# Patient Record
Sex: FEMALE | Race: WHITE | NOT HISPANIC OR LATINO | Employment: FULL TIME | URBAN - METROPOLITAN AREA
[De-identification: names, ages, dates, MRNs, and addresses within clinical notes are randomized per-mention and may not be internally consistent; named-entity substitution may affect disease eponyms.]

---

## 2017-02-16 ENCOUNTER — HOSPITAL ENCOUNTER (OUTPATIENT)
Dept: LAB | Facility: MEDICAL CENTER | Age: 42
End: 2017-02-16
Attending: INTERNAL MEDICINE
Payer: COMMERCIAL

## 2017-02-16 LAB
ALBUMIN SERPL BCP-MCNC: 4.1 G/DL (ref 3.2–4.9)
ALBUMIN/GLOB SERPL: 1.2 G/DL
ALP SERPL-CCNC: 80 U/L (ref 30–99)
ALT SERPL-CCNC: 22 U/L (ref 2–50)
ANION GAP SERPL CALC-SCNC: 9 MMOL/L (ref 0–11.9)
AST SERPL-CCNC: 16 U/L (ref 12–45)
BILIRUB SERPL-MCNC: 1.2 MG/DL (ref 0.1–1.5)
BUN SERPL-MCNC: 9 MG/DL (ref 8–22)
CALCIUM SERPL-MCNC: 9.8 MG/DL (ref 8.5–10.5)
CHLORIDE SERPL-SCNC: 101 MMOL/L (ref 96–112)
CO2 SERPL-SCNC: 23 MMOL/L (ref 20–33)
CREAT SERPL-MCNC: 0.68 MG/DL (ref 0.5–1.4)
EST. AVERAGE GLUCOSE BLD GHB EST-MCNC: 309 MG/DL
GLOBULIN SER CALC-MCNC: 3.3 G/DL (ref 1.9–3.5)
GLUCOSE SERPL-MCNC: 331 MG/DL (ref 65–99)
HBA1C MFR BLD: 12.4 % (ref 0–5.6)
POTASSIUM SERPL-SCNC: 4.6 MMOL/L (ref 3.6–5.5)
PROT SERPL-MCNC: 7.4 G/DL (ref 6–8.2)
SODIUM SERPL-SCNC: 133 MMOL/L (ref 135–145)

## 2017-02-16 PROCEDURE — 86038 ANTINUCLEAR ANTIBODIES: CPT

## 2017-02-16 PROCEDURE — 80053 COMPREHEN METABOLIC PANEL: CPT

## 2017-02-16 PROCEDURE — 83036 HEMOGLOBIN GLYCOSYLATED A1C: CPT

## 2017-02-16 PROCEDURE — 36415 COLL VENOUS BLD VENIPUNCTURE: CPT

## 2017-02-19 LAB
NUCLEAR IGG SER QL IA: DETECTED
NUCLEAR IGG TITR SER IF: ABNORMAL {TITER}

## 2017-02-28 ENCOUNTER — HOSPITAL ENCOUNTER (OUTPATIENT)
Dept: LAB | Facility: MEDICAL CENTER | Age: 42
End: 2017-02-28
Attending: INTERNAL MEDICINE
Payer: COMMERCIAL

## 2017-02-28 PROCEDURE — 81001 URINALYSIS AUTO W/SCOPE: CPT

## 2017-02-28 PROCEDURE — 86225 DNA ANTIBODY NATIVE: CPT

## 2017-02-28 PROCEDURE — 86038 ANTINUCLEAR ANTIBODIES: CPT

## 2017-02-28 PROCEDURE — 86235 NUCLEAR ANTIGEN ANTIBODY: CPT

## 2017-02-28 PROCEDURE — 85025 COMPLETE CBC W/AUTO DIFF WBC: CPT

## 2017-02-28 PROCEDURE — 83516 IMMUNOASSAY NONANTIBODY: CPT | Mod: 91

## 2017-02-28 PROCEDURE — 36415 COLL VENOUS BLD VENIPUNCTURE: CPT

## 2017-03-04 LAB
APPEARANCE UR: CLEAR
BASOPHILS # BLD AUTO: 0.11 K/UL (ref 0–0.12)
BASOPHILS NFR BLD AUTO: 1 % (ref 0–1.8)
BILIRUB UR QL STRIP.AUTO: NEGATIVE
CENTROMERE IGG TITR SER IF: 4 AU/ML (ref 0–40)
CHROMATIN IGG SERPL-ACNC: 9 UNITS (ref 0–19)
COLOR UR AUTO: YELLOW
DSDNA AB TITR SER CLIF: DETECTED {TITER}
DSDNA IGG TITR SER CLIF: ABNORMAL {TITER}
ENA JO1 AB TITR SER: 2 AU/ML (ref 0–40)
ENA SCL70 IGG SER QL: 1 AU/ML (ref 0–40)
ENA SM IGG SER-ACNC: 0 AU/ML (ref 0–40)
ENA SS-B IGG SER IA-ACNC: 0 AU/ML (ref 0–40)
EOSINOPHIL # BLD: 0.1 K/UL (ref 0–0.51)
EOSINOPHIL NFR BLD AUTO: 0.9 % (ref 0–6.9)
EPITHELIAL CELLS 1715: ABNORMAL /HPF
ERYTHROCYTE [DISTWIDTH] IN BLOOD BY AUTOMATED COUNT: 37.2 FL (ref 35.9–50)
GLUCOSE UR STRIP.AUTO-MCNC: >1000 MG/DL
HCT VFR BLD AUTO: 47.3 % (ref 37–47)
HGB BLD-MCNC: 16.3 G/DL (ref 12–16)
IMM GRANULOCYTES # BLD AUTO: 0.05 K/UL (ref 0–0.11)
IMM GRANULOCYTES NFR BLD AUTO: 0.5 % (ref 0–0.9)
KETONES UR STRIP.AUTO-MCNC: ABNORMAL MG/DL
LEUKOCYTE ESTERASE UR QL STRIP.AUTO: NEGATIVE
LYMPHOCYTES # BLD: 3.59 K/UL (ref 1–4.8)
LYMPHOCYTES NFR BLD AUTO: 33.6 % (ref 22–41)
MCH RBC QN AUTO: 29.5 PG (ref 27–33)
MCHC RBC AUTO-ENTMCNC: 34.5 G/DL (ref 33.6–35)
MCV RBC AUTO: 85.5 FL (ref 81.4–97.8)
MICRO URNS: ABNORMAL
MONOCYTES # BLD: 0.54 K/UL (ref 0–0.85)
MONOCYTES NFR BLD AUTO: 5 % (ref 0–13.4)
NEUTROPHILS # BLD: 6.31 K/UL (ref 2–7.15)
NEUTROPHILS NFR BLD AUTO: 59 % (ref 44–72)
NITRITE UR QL STRIP.AUTO: NEGATIVE
NRBC # BLD AUTO: 0 K/UL
NRBC BLD-RTO: 0 /100 WBC
NUCLEAR IGG SER QL IA: DETECTED
NUCLEAR IGG TITR SER IF: ABNORMAL {TITER}
PH UR: 5.5 [PH]
PLATELET # BLD AUTO: 311 K/UL (ref 164–446)
PMV BLD AUTO: 10.8 FL (ref 9–12.9)
PROT UR QL STRIP: NEGATIVE MG/DL
RBC # BLD AUTO: 5.53 M/UL (ref 4.2–5.4)
RBC #/AREA URNS HPF: ABNORMAL /HPF
RBC UR QL AUTO: ABNORMAL
SP GR UR STRIP.AUTO: >1.035
SSA52 R0ENA AB IGG Q0420: 4 AU/ML (ref 0–40)
SSA60 R0ENA AB IGG Q0419: 2 AU/ML (ref 0–40)
TEST NAME 95000: NORMAL
U1 SNRNP IGG SER QL: 0 AU/ML (ref 0–40)
WBC # BLD AUTO: 10.7 K/UL (ref 4.8–10.8)

## 2019-05-14 ENCOUNTER — OFFICE VISIT (OUTPATIENT)
Dept: ENDOCRINOLOGY | Facility: MEDICAL CENTER | Age: 44
End: 2019-05-14
Payer: COMMERCIAL

## 2019-05-14 ENCOUNTER — HOSPITAL ENCOUNTER (OUTPATIENT)
Dept: LAB | Facility: MEDICAL CENTER | Age: 44
End: 2019-05-14
Attending: PHYSICIAN ASSISTANT
Payer: COMMERCIAL

## 2019-05-14 VITALS
WEIGHT: 251 LBS | HEIGHT: 70 IN | BODY MASS INDEX: 35.93 KG/M2 | SYSTOLIC BLOOD PRESSURE: 120 MMHG | HEART RATE: 90 BPM | DIASTOLIC BLOOD PRESSURE: 76 MMHG | OXYGEN SATURATION: 97 %

## 2019-05-14 DIAGNOSIS — Z79.4 ENCOUNTER FOR LONG-TERM (CURRENT) USE OF INSULIN (HCC): ICD-10-CM

## 2019-05-14 DIAGNOSIS — E11.65 UNCONTROLLED TYPE 2 DIABETES MELLITUS WITH HYPERGLYCEMIA (HCC): ICD-10-CM

## 2019-05-14 LAB
ALBUMIN SERPL BCP-MCNC: 4.1 G/DL (ref 3.2–4.9)
ALBUMIN/GLOB SERPL: 1.2 G/DL
ALP SERPL-CCNC: 82 U/L (ref 30–99)
ALT SERPL-CCNC: 41 U/L (ref 2–50)
ANION GAP SERPL CALC-SCNC: 10 MMOL/L (ref 0–11.9)
AST SERPL-CCNC: 56 U/L (ref 12–45)
BILIRUB SERPL-MCNC: 1.1 MG/DL (ref 0.1–1.5)
BUN SERPL-MCNC: 8 MG/DL (ref 8–22)
CALCIUM SERPL-MCNC: 9.6 MG/DL (ref 8.5–10.5)
CHLORIDE SERPL-SCNC: 102 MMOL/L (ref 96–112)
CO2 SERPL-SCNC: 21 MMOL/L (ref 20–33)
CREAT SERPL-MCNC: 0.68 MG/DL (ref 0.5–1.4)
CREAT UR-MCNC: 93.4 MG/DL
FASTING STATUS PATIENT QL REPORTED: NORMAL
GLOBULIN SER CALC-MCNC: 3.3 G/DL (ref 1.9–3.5)
GLUCOSE SERPL-MCNC: 357 MG/DL (ref 65–99)
MICROALBUMIN UR-MCNC: 1.7 MG/DL
MICROALBUMIN/CREAT UR: 18 MG/G (ref 0–30)
POTASSIUM SERPL-SCNC: 4.1 MMOL/L (ref 3.6–5.5)
PROT SERPL-MCNC: 7.4 G/DL (ref 6–8.2)
SODIUM SERPL-SCNC: 133 MMOL/L (ref 135–145)

## 2019-05-14 PROCEDURE — 86337 INSULIN ANTIBODIES: CPT

## 2019-05-14 PROCEDURE — 36415 COLL VENOUS BLD VENIPUNCTURE: CPT

## 2019-05-14 PROCEDURE — 82570 ASSAY OF URINE CREATININE: CPT

## 2019-05-14 PROCEDURE — 99204 OFFICE O/P NEW MOD 45 MIN: CPT | Performed by: PHYSICIAN ASSISTANT

## 2019-05-14 PROCEDURE — 86341 ISLET CELL ANTIBODY: CPT | Mod: 91

## 2019-05-14 PROCEDURE — 84681 ASSAY OF C-PEPTIDE: CPT

## 2019-05-14 PROCEDURE — 80053 COMPREHEN METABOLIC PANEL: CPT

## 2019-05-14 PROCEDURE — 82043 UR ALBUMIN QUANTITATIVE: CPT

## 2019-05-14 RX ORDER — LEUCOVORIN CALCIUM 5 MG/1
TABLET ORAL
COMMUNITY
Start: 2019-04-26

## 2019-05-14 RX ORDER — FOLIC ACID 1 MG/1
TABLET ORAL
COMMUNITY
Start: 2019-04-25

## 2019-05-14 RX ORDER — SULFAMETHOXAZOLE AND TRIMETHOPRIM 800; 160 MG/1; MG/1
TABLET ORAL
COMMUNITY
Start: 2019-05-10

## 2019-05-14 RX ORDER — ALPRAZOLAM 0.25 MG/1
TABLET ORAL
COMMUNITY
Start: 2019-03-20

## 2019-05-14 RX ORDER — ROSUVASTATIN CALCIUM 10 MG/1
TABLET, COATED ORAL
COMMUNITY
Start: 2019-03-20 | End: 2021-01-25

## 2019-05-14 RX ORDER — INSULIN GLARGINE 100 [IU]/ML
INJECTION, SOLUTION SUBCUTANEOUS
COMMUNITY
Start: 2019-03-29 | End: 2019-07-26

## 2019-05-14 RX ORDER — BLOOD SUGAR DIAGNOSTIC
STRIP MISCELLANEOUS
Refills: 12 | COMMUNITY
Start: 2019-02-15

## 2019-05-14 NOTE — PROGRESS NOTES
New Patient Consult Note  Referred by: Bry Eid M.D.    Reason for consult: Diabetes Management Type 2    HPI:  Meredith Corral is a 43 y.o. old patient who is seeing us today for diabetes care.  This is a pleasant patient with diabetes and I appreciate the opportunity to participate in the care of this patient.  This is a new patient with me today.    Labs of  4/17/2019 HbA1c is 10.7,     BG Diary:5/14/2019  In the AM: no log    Has been Diabetic since T2   Has a Glucagon pen at home: no    1. Uncontrolled type 2 diabetes mellitus with hyperglycemia (HCC)  This is a new patient with me on 5/14/2019  They are on:  1.  Lantus  20 units   2.  Humalog 24 units with three times a day    States Metformin causes her to be sick  States GLP-1 cause her to be ill    2. Encounter for long-term (current) use of insulin (HCC)  Is on a high risk medication Insulin and we will continue to follow       ROS:   Constitutional: No change in weight , No fatigue, No night sweats.  HEENT: No Headache.  Eyes:  No blurred vision, No visual changes.  Cardiac: No chest pain, No palpitations.  Resp: No shortness of breath, No cough,   Gastro: No nausea or vomiting, No diarrhea.  Neuro: Denies numbness or tinging in bilateral feet or hands, and no loss of sensation.  Endo: No heat or cold intolerance.  : No polyuria, No polydipsia, No chronic UTI's.  Lower extremities: No lower leg edema bilateral.  All other systems were reviewed and were negative.    Past Medical History:  Patient Active Problem List    Diagnosis Date Noted   • Uncontrolled type 2 diabetes mellitus with hyperglycemia (HCC) 05/14/2019   • Encounter for long-term (current) use of insulin (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     Social History Main Topics   • Smoking status: Former Smoker     Packs/day: 0.50     Types: Cigarettes     Quit date: 5/14/2013   • Smokeless tobacco: Former User   • Alcohol use Yes      Comment: rarely   • Drug use: No   • Sexual activity: Not on file     Other Topics Concern   • Not on file     Social History Narrative   • No narrative on file       Family History:  History reviewed. No pertinent family history.    Medications:    Current Outpatient Prescriptions:   •  LANTUS SOLOSTAR 100 UNIT/ML Solution Pen-injector injection, , Disp: , Rfl:   •  methotrexate 2.5 MG Tab, Take 2.5 mg by mouth every 7 days., Disp: , Rfl:   •  leucovorin 5 MG Tab, , Disp: , Rfl:   •  rosuvastatin (CRESTOR) 10 MG Tab, , Disp: , Rfl:   •  folic acid (FOLVITE) 1 MG Tab, , Disp: , Rfl:   •  ALPRAZolam (XANAX) 0.25 MG Tab, , Disp: , Rfl:   •  sulfamethoxazole-trimethoprim (BACTRIM DS) 800-160 MG tablet, , Disp: , Rfl:   •  lorazepam (ATIVAN) 0.5 MG Tab, Take 0.5 mg by mouth every four hours as needed for Anxiety., Disp: , Rfl:   •  hydrocodone-acetaminophen (NORCO) 7.5-325 MG per tablet, Take 1-2 Tabs by mouth every 6 hours as needed., Disp: , Rfl:   •  Cholecalciferol (VITAMIN D) 2000 UNITS CAPS, Take 4,000 Units by mouth every day., Disp: , Rfl:   •  Multiple Vitamin (MULTI-VITAMIN DAILY PO), Take  by mouth every day., Disp: , Rfl:   •  ONETOUCH VERIO strip, , Disp: , Rfl: 12  •  GABAPENTIN PO, Take  by mouth 3 times a day., Disp: , Rfl:       Physical Examination:  Vital signs: /76 (BP Location: Left arm, Patient Position: Sitting)   Pulse 90   Ht 1.778 m (5' 10\")   Wt 113.9 kg (251 lb)   SpO2 97%   BMI 36.01 " kg/m²   General: No distress, cooperative, well dressed and well nourished.   Eyes: No scleral icterus or discharge, No hyposphagma  ENMT: Normal on external inspection of nose, lips, No nasal drainage   Neck: No abnormal masses on inspection  Resp: Normal effort, Bilateral clear to auscultation, No wheezing, No rales  CVS: Regular rate and rhythm, S1 S2 normal, No murmur. No gallop  Extremities: No edema bilateral extremities  Neuro: Alert and oriented  Skin: No rash, No Ulcers  Psych: Normal mood and affect      Assessment and Plan:    1. Uncontrolled type 2 diabetes mellitus with hyperglycemia (HCC)  Start:  1.   Jardiance 25mg one a day  2.   Tresiba 40 units before bed  3.   Humalog  Carb ratio of 1:10  Correction Factor 1:30 above 100      I will start a low dose of Victoza    States Metformin causes her to be sick  States GLP-1 cause her to be ill    2. Encounter for long-term (current) use of insulin (HCC)  Is on a high risk medication Insulin and we will continue to follow     Return in about 1 month (around 6/14/2019).       This patient during there office visit was started on new medication Jardiance and tresiba.  Side effects of new medications were discussed with the patient today in the office. The patient was supplied paperwork on this new medication.    Thank you kindly for allowing me to participate in the diabetes care plan for this patient.    Judd Salas PA-C, BC-ADM  Board Certified - Advanced Diabetes Management  05/14/19    CC:   Bry Eid M.D.

## 2019-05-16 LAB
C PEPTIDE SERPL-MCNC: 2.2 NG/ML (ref 0.8–3.5)
GAD65 AB SER IA-ACNC: <5 IU/ML (ref 0–5)
INSULIN HUMAN AB SER-ACNC: <0.4 U/ML (ref 0–0.4)
PANC ISLET CELL AB TITR SER: NORMAL {TITER}

## 2019-06-14 ENCOUNTER — OFFICE VISIT (OUTPATIENT)
Dept: ENDOCRINOLOGY | Facility: MEDICAL CENTER | Age: 44
End: 2019-06-14
Payer: COMMERCIAL

## 2019-06-14 VITALS
OXYGEN SATURATION: 98 % | BODY MASS INDEX: 36.36 KG/M2 | HEART RATE: 89 BPM | HEIGHT: 70 IN | WEIGHT: 254 LBS | DIASTOLIC BLOOD PRESSURE: 72 MMHG | SYSTOLIC BLOOD PRESSURE: 110 MMHG

## 2019-06-14 DIAGNOSIS — E11.65 UNCONTROLLED TYPE 2 DIABETES MELLITUS WITH HYPERGLYCEMIA (HCC): ICD-10-CM

## 2019-06-14 DIAGNOSIS — Z79.4 ENCOUNTER FOR LONG-TERM (CURRENT) USE OF INSULIN (HCC): ICD-10-CM

## 2019-06-14 PROCEDURE — 99214 OFFICE O/P EST MOD 30 MIN: CPT | Performed by: PHYSICIAN ASSISTANT

## 2019-06-14 NOTE — PROGRESS NOTES
Return to office Patient Consult Note  Referred by: Bry Eid M.D.    Reason for consult: Diabetes Management Type 2    HPI:  Meredith Corral is a 43 y.o. old patient who is seeing us today for diabetes care.  This is a pleasant patient with diabetes and I appreciate the opportunity to participate in the care of this patient.      Labs of  4/17/2019 HbA1c is 10.7,     BG Diary:6/14/2019  In the AM:  No log        1. Uncontrolled type 2 diabetes mellitus with hyperglycemia (HCC)  This is a new patient with me on 5/14/2019  They are on:  1.  Lantus  20 units   2.  Humalog 24 units with three times a day     States Metformin causes her to be sick  States GLP-1 cause her to be ill    Start:  1.   Jardiance 25mg one a day  2.   Tresiba 40 units before bed  3.   Humalog  Carb ratio of 1:10  Correction Factor 1:30 above 100          States Metformin causes her to be sick  States GLP-1 cause her to be ill    She is doing well no issues    2. Encounter for long-term (current) use of insulin ([facility])  Is on a high risk medication Insulin and we will continue to follow           ROS:   Constitutional: No night sweats.  Eyes:  No visual changes.  Cardiac: No chest pain, No palpitations or racing heart rate.  Resp: No shortness of breath, No cough,   Gi: No Diarrhea    All other systems were reviewed and were/are negative.      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     Social History Main Topics   • Smoking status: Former Smoker     Packs/day: 0.50     Types: Cigarettes     Quit date: 5/14/2013   • Smokeless tobacco: Former User   • Alcohol use Yes      Comment: rarely   • Drug use: No   • Sexual activity: Not on file     Other Topics Concern   • Not on file     Social History Narrative   • No narrative on file       Family History:  History reviewed. No pertinent family history.    Medications:    Current Outpatient Prescriptions:   •  LANTUS SOLOSTAR 100 UNIT/ML Solution Pen-injector injection, , Disp: , Rfl:   •  methotrexate 2.5 MG Tab, Take 2.5 mg by mouth every 7 days., Disp: , Rfl:   •  leucovorin 5 MG Tab, , Disp: , Rfl:   •  rosuvastatin (CRESTOR) 10 MG Tab, , Disp: , Rfl:   •  ONETOUCH VERIO strip, , Disp: , Rfl: 12  •  folic acid (FOLVITE) 1 MG Tab, , Disp: , Rfl:   •  ALPRAZolam (XANAX) 0.25 MG Tab, , Disp: , Rfl:   •  sulfamethoxazole-trimethoprim (BACTRIM DS) 800-160 MG tablet, , Disp: , Rfl:   •  lorazepam (ATIVAN) 0.5 MG Tab, Take 0.5 mg by mouth every four hours as needed for Anxiety., Disp: , Rfl:   •  hydrocodone-acetaminophen (NORCO) 7.5-325 MG per tablet, Take 1-2 Tabs by mouth every 6 hours as needed., Disp: , Rfl:   •  GABAPENTIN PO, Take  by mouth 3 times a day., Disp: , Rfl:   •  Cholecalciferol (VITAMIN D) 2000 UNITS CAPS, Take 4,000 Units by mouth every day., Disp: , Rfl:   •  Multiple Vitamin (MULTI-VITAMIN DAILY PO), Take  by mouth every day., Disp: , Rfl:         Physical Examination:   Vital signs: /72 (BP Location: Left arm, Patient Position: Sitting)   Pulse 89   Ht 1.778 m (5' 10\")   Wt 115.2 kg (254 lb)   SpO2 98%   BMI 36.45 kg/m²   General: No distress, cooperative, well dressed and well nourished.   Eyes: No scleral " icterus or discharge, No hyposphagma  ENMT: Normal on external inspection of nose, lips, No nasal drainage   Neck: No abnormal masses on inspection  Resp: Normal effort, Bilateral clear to auscultation, No wheezing, No rales  CVS: Regular rate and rhythm, S1 S2 normal, No murmur. No gallop  Extremities: No edema bilateral extremities  Neuro: Alert and oriented  Skin: No rash, No Ulcers  Psych: Normal mood and affect      Assessment and Plan:    1. Uncontrolled type 2 diabetes mellitus with hyperglycemia (HCC)    Start:  1.   Jardiance 25mg one a day  2.   Tresiba 40 units before bed  3.   Humalog  Carb ratio of 1:10  Correction Factor 1:30 above 100       I will start a low dose of Victoza 5 clicks for two weeks then INCREASE to 0.6    2. Encounter for long-term (current) use of insulin (HCC)  Is on a high risk medication Insulin and we will continue to follow         No Follow-up on file.      This patient during there office visit today was started on a new medication Victoza.  Side effects of the new medication were discussed with the patient today in the office.       Thank you kindly for allowing me to participate in the diabetes care plan for this patient.    Judd Salas PA-C, BC-ADM  Board Certified - Advanced Diabetes Management  06/14/19    CC:   Bry Eid M.D.

## 2019-07-26 ENCOUNTER — OFFICE VISIT (OUTPATIENT)
Dept: ENDOCRINOLOGY | Facility: MEDICAL CENTER | Age: 44
End: 2019-07-26
Payer: COMMERCIAL

## 2019-07-26 ENCOUNTER — HOSPITAL ENCOUNTER (OUTPATIENT)
Dept: LAB | Facility: MEDICAL CENTER | Age: 44
End: 2019-07-26
Attending: PHYSICIAN ASSISTANT
Payer: COMMERCIAL

## 2019-07-26 VITALS
DIASTOLIC BLOOD PRESSURE: 76 MMHG | WEIGHT: 254 LBS | HEART RATE: 87 BPM | HEIGHT: 70 IN | BODY MASS INDEX: 36.36 KG/M2 | OXYGEN SATURATION: 99 % | SYSTOLIC BLOOD PRESSURE: 116 MMHG

## 2019-07-26 DIAGNOSIS — Z79.4 ENCOUNTER FOR LONG-TERM (CURRENT) USE OF INSULIN (HCC): ICD-10-CM

## 2019-07-26 DIAGNOSIS — E11.65 UNCONTROLLED TYPE 2 DIABETES MELLITUS WITH HYPERGLYCEMIA (HCC): ICD-10-CM

## 2019-07-26 LAB
HBA1C MFR BLD: 9.1 % (ref 0–5.6)
INT CON NEG: NEGATIVE
INT CON POS: POSITIVE
T3FREE SERPL-MCNC: 3.51 PG/ML (ref 2.4–4.2)
T4 FREE SERPL-MCNC: 0.81 NG/DL (ref 0.53–1.43)
THYROPEROXIDASE AB SERPL-ACNC: 1 IU/ML (ref 0–9)
TSH SERPL DL<=0.005 MIU/L-ACNC: 2.07 UIU/ML (ref 0.38–5.33)

## 2019-07-26 PROCEDURE — 99214 OFFICE O/P EST MOD 30 MIN: CPT | Performed by: PHYSICIAN ASSISTANT

## 2019-07-26 PROCEDURE — 84481 FREE ASSAY (FT-3): CPT

## 2019-07-26 PROCEDURE — 83036 HEMOGLOBIN GLYCOSYLATED A1C: CPT | Performed by: PHYSICIAN ASSISTANT

## 2019-07-26 PROCEDURE — 84439 ASSAY OF FREE THYROXINE: CPT

## 2019-07-26 PROCEDURE — 36415 COLL VENOUS BLD VENIPUNCTURE: CPT

## 2019-07-26 PROCEDURE — 86376 MICROSOMAL ANTIBODY EACH: CPT

## 2019-07-26 PROCEDURE — 84443 ASSAY THYROID STIM HORMONE: CPT

## 2019-07-26 RX ORDER — LIRAGLUTIDE 6 MG/ML
1.8 INJECTION SUBCUTANEOUS DAILY
Qty: 3 PEN | Refills: 6 | Status: SHIPPED | OUTPATIENT
Start: 2019-07-26 | End: 2019-12-26

## 2019-07-26 RX ORDER — EMPAGLIFLOZIN 25 MG/1
1 TABLET, FILM COATED ORAL DAILY
Qty: 30 TAB | Refills: 3 | Status: SHIPPED | OUTPATIENT
Start: 2019-07-26 | End: 2021-01-25

## 2019-07-26 NOTE — PROGRESS NOTES
Return to office Patient Consult Note  Referred by: Bry Eid M.D.    Reason for consult: Diabetes Management Type 2    HPI:  Meredith Corral is a 43 y.o. old patient who is seeing us today for diabetes care.  This is a pleasant patient with diabetes and I appreciate the opportunity to participate in the care of this patient.    Labs of 7/26/2019 HbA1c is 9.1  Labs of 5/14/19 c-peptide 2.2, microalbumin negative, GFR>60     Labs of  4/17/2019 HbA1c is 10.7,     BG Diary:7/26/2019  In the AM:  No log    1. Uncontrolled type 2 diabetes mellitus with hyperglycemia (HCC)  This is a new patient with me on 5/14/2019    Now on:  1.   Jardiance 25mg one a day  2.   Tresiba 40 units before bed  3.   Humalog  Carb ratio of 1:10  Correction Factor 1:30 above 100       4.   Victoza 1.2 daily     States Metformin causes her to be sick  States GLP-1 cause her to be ill    2. Encounter for long-term (current) use of insulin (HCC)  Is on a high risk medication Insulin and we will continue to follow          ROS:   Constitutional: No night sweats.  Eyes:  No visual changes.  Cardiac: No chest pain, No palpitations or racing heart rate.  Resp: No shortness of breath, No cough,   Gi: No Diarrhea    All other systems were reviewed and were/are negative.      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     Social History Main Topics   • Smoking status: Former Smoker     Packs/day: 0.50     Types: Cigarettes     Quit date: 5/14/2013   • Smokeless tobacco: Former User   • Alcohol use Yes      Comment: rarely   • Drug use: No   • Sexual activity: Not on file     Other Topics Concern   • Not on file     Social History Narrative   • No narrative on file       Family History:  History reviewed. No pertinent family history.    Medications:    Current Outpatient Prescriptions:   •  JARDIANCE 25 MG Tab, Take 1 tablet by mouth every day., Disp: 30 Tab, Rfl: 3  •  glucose blood (ONETOUCH VERIO) strip, 1 Strip by Other route 3 times a day., Disp: 100 Strip, Rfl: 11  •  VICTOZA 18 MG/3ML Solution Pen-injector injection, Inject 0.3 mL as instructed every day., Disp: 3 PEN, Rfl: 6  •  Insulin Degludec (TRESIBA FLEXTOUCH) 200 UNIT/ML Solution Pen-injector, Inject 50 Units as instructed every bedtime., Disp: 3 PEN, Rfl: 2  •  methotrexate 2.5 MG Tab, Take 2.5 mg by mouth every 7 days., Disp: , Rfl:   •  leucovorin 5 MG Tab, , Disp: , Rfl:   •  rosuvastatin (CRESTOR) 10 MG Tab, , Disp: , Rfl:   •  ONETOUCH VERIO strip, , Disp: , Rfl: 12  •  folic acid (FOLVITE) 1 MG Tab, , Disp: , Rfl:   •  ALPRAZolam (XANAX) 0.25 MG Tab, , Disp: , Rfl:   •  sulfamethoxazole-trimethoprim (BACTRIM DS) 800-160 MG tablet, , Disp: , Rfl:   •  lorazepam (ATIVAN) 0.5 MG Tab, Take 0.5 mg by mouth every four hours as needed for Anxiety., Disp: , Rfl:   •  hydrocodone-acetaminophen (NORCO) 7.5-325 MG per tablet, Take 1-2 Tabs by mouth every 6 hours as needed., Disp: , Rfl:   •  GABAPENTIN PO, Take  by mouth 3 times a day., Disp: , Rfl:   •  Cholecalciferol (VITAMIN D) 2000 UNITS CAPS, Take 4,000 Units by mouth every day., Disp: , Rfl:   •  Multiple Vitamin (MULTI-VITAMIN DAILY PO), Take  by mouth every day., Disp: , Rfl:         Physical  "Examination:   Vital signs: /76 (BP Location: Left arm, Patient Position: Sitting)   Pulse 87   Ht 1.778 m (5' 10\")   Wt 115.2 kg (254 lb)   SpO2 99%   BMI 36.45 kg/m²   General: No distress, cooperative, well dressed and well nourished.   Eyes: No scleral icterus or discharge, No hyposphagma  ENMT: Normal on external inspection of nose, lips, No nasal drainage   Neck: No abnormal masses on inspection  Resp: Normal effort, Bilateral clear to auscultation, No wheezing, No rales  CVS: Regular rate and rhythm, S1 S2 normal, No murmur. No gallop  Extremities: No edema bilateral extremities  Neuro: Alert and oriented  Skin: No rash, No Ulcers  Psych: Normal mood and affect      Assessment and Plan:    1. Uncontrolled type 2 diabetes mellitus with hyperglycemia (HCC)  Now on:  1.   Jardiance 25mg one a day  2.   Tresiba 40 units before bed (INCREASE to 50)  3.   Humalog  Carb ratio of 1:10  Correction Factor 1:30 above 100       4.   Victoza 1.2 daily     States Metformin causes her to be sick  States GLP-1 cause her to be ill    2. Encounter for long-term (current) use of insulin (HCC)  Is on a high risk medication Insulin and we will continue to follow     Return in about 3 months (around 10/26/2019).      Thank you kindly for allowing me to participate in the diabetes care plan for this patient.    Judd Salas PA-C, BC-ADM  Board Certified - Advanced Diabetes Management  07/26/19    CC:   Bry Eid M.D.    "

## 2019-10-11 ENCOUNTER — PATIENT MESSAGE (OUTPATIENT)
Dept: ENDOCRINOLOGY | Facility: MEDICAL CENTER | Age: 44
End: 2019-10-11

## 2019-10-11 NOTE — TELEPHONE ENCOUNTER
From: Meredith Corral  To: Judd Salas P.A.-C.  Sent: 10/11/2019 9:39 AM PDT  Subject: Prescription Question    I can not get the Victoza w/my ins it costs why to much. Can you please up the Tresiba dose form 50 units a night to 70 please to balance out no longer being able to take Victoza. I also tried to get my prescription filled for Jardiance and they had to send over a request to your office for a authorization from my ins. Thank you

## 2019-10-15 ENCOUNTER — TELEPHONE (OUTPATIENT)
Dept: ENDOCRINOLOGY | Facility: MEDICAL CENTER | Age: 44
End: 2019-10-15

## 2019-10-15 NOTE — TELEPHONE ENCOUNTER
DOCUMENTATION OF PAR STATUS:    1. Name of Medication & Dose: Jardiance 25mg     2. Name of Prescription Coverage Company & phone #: [employer]    3. Date Prior Auth Submitted: 10/15/19    4. What information was given to obtain insurance decision? E11.65 Chart notes and labs    5. Prior Auth Status? Pending    6. Action Taken: Pharmacy Notified: yes

## 2019-10-16 NOTE — TELEPHONE ENCOUNTER
FINAL PRIOR AUTHORIZATION STATUS:    1.  Name of Medication & Dose: Jardiance 25 mg     2. Prior Auth Status (if approved--length of approval):  Approved 10/15/19-10/14/20    3. Action Taken: Pharmacy Notified: yes    Documentation was scanned into media and attached to this telephone encounter.

## 2019-12-09 ENCOUNTER — TELEPHONE (OUTPATIENT)
Dept: ENDOCRINOLOGY | Facility: MEDICAL CENTER | Age: 44
End: 2019-12-09

## 2019-12-09 NOTE — TELEPHONE ENCOUNTER
Was the patient seen in the last year in this department? Yes07/26/19    Does patient have an active prescription for medications requested? No     Received Request Via: Pharmacy     Per LOV note:  Humalog Kwikpen  Carb ratio of 1:10  Correction Factor 1:30 above 100

## 2019-12-26 ENCOUNTER — OFFICE VISIT (OUTPATIENT)
Dept: ENDOCRINOLOGY | Facility: MEDICAL CENTER | Age: 44
End: 2019-12-26
Payer: COMMERCIAL

## 2019-12-26 VITALS
WEIGHT: 251 LBS | OXYGEN SATURATION: 99 % | HEIGHT: 70 IN | BODY MASS INDEX: 35.93 KG/M2 | DIASTOLIC BLOOD PRESSURE: 76 MMHG | HEART RATE: 105 BPM | SYSTOLIC BLOOD PRESSURE: 110 MMHG

## 2019-12-26 DIAGNOSIS — Z79.4 ENCOUNTER FOR LONG-TERM (CURRENT) USE OF INSULIN (HCC): ICD-10-CM

## 2019-12-26 DIAGNOSIS — E11.65 UNCONTROLLED TYPE 2 DIABETES MELLITUS WITH HYPERGLYCEMIA (HCC): ICD-10-CM

## 2019-12-26 LAB
HBA1C MFR BLD: 9.4 % (ref 0–5.6)
INT CON NEG: NEGATIVE
INT CON POS: POSITIVE

## 2019-12-26 PROCEDURE — 83036 HEMOGLOBIN GLYCOSYLATED A1C: CPT | Performed by: PHYSICIAN ASSISTANT

## 2019-12-26 PROCEDURE — 99214 OFFICE O/P EST MOD 30 MIN: CPT | Mod: 25 | Performed by: PHYSICIAN ASSISTANT

## 2019-12-26 NOTE — PATIENT INSTRUCTIONS
Now on:  1.   Jardiance 25mg one a day  2.   Tresiba 60 units before bed (Increase to 70)  3.   Humalog  Carb ratio of 1:10  Correction Factor 1:30 above 100

## 2019-12-26 NOTE — PROGRESS NOTES
Return to office Patient Consult Note  Referred by: Bry Eid M.D.    Reason for consult: Diabetes Management Type 2    HPI:  Meredith Corral is a 44 y.o. old patient who is seeing us today for diabetes care.  This is a pleasant patient with diabetes and I appreciate the opportunity to participate in the care of this patient.    Labs of 12/26/2019 HbA1c is 9.4  Labs of 7/26/2019 HbA1c is 9.1  Labs of 5/14/19 c-peptide 2.2, microalbumin negative, GFR>60  Labs of  4/17/2019 HbA1c is 10.7,     BG Diary:12/26/2019  In the AM:  No log      1. Uncontrolled type 2 diabetes mellitus with hyperglycemia (HCC)  This is a new patient with me on 5/14/2019     Now on:  1.   Jardiance 25mg one a day  2.   Tresiba 50 units before bed  3.   Humalog  Carb ratio of 1:10  Correction Factor 1:30 above 100       4.   Victoza 1.2 daily (not on)     States Metformin causes her to be sick  States GLP-1 cause her to be ill    2. Encounter for long-term (current) use of insulin (HCC)  Is on a high risk medication Insulin and we will continue to follow       ROS:   Constitutional: No night sweats.  Eyes:  No visual changes.  Cardiac: No chest pain, No palpitations or racing heart rate.  Resp: No shortness of breath, No cough,   Gi: No Diarrhea    All other systems were reviewed and were/are negative.      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   • Vitamin D deficiency 08/28/2013       Past Surgical History:  Past Surgical History:   Procedure Laterality Date   • APPENDECTOMY     • CHOLECYSTECTOMY     • LAMINOTOMY     • TONSILLECTOMY         Allergies:  Demerol; Metformin; Morphine; and Trulicity [dulaglutide]    Social History:  Social History      Socioeconomic History   • Marital status: Single     Spouse name: Not on file   • Number of children: Not on file   • Years of education: Not on file   • Highest education level: Not on file   Occupational History   • Not on file   Social Needs   • Financial resource strain: Not on file   • Food insecurity:     Worry: Not on file     Inability: Not on file   • Transportation needs:     Medical: Not on file     Non-medical: Not on file   Tobacco Use   • Smoking status: Former Smoker     Packs/day: 0.50     Types: Cigarettes     Last attempt to quit: 2013     Years since quittin.6   • Smokeless tobacco: Former User   Substance and Sexual Activity   • Alcohol use: Yes     Comment: rarely   • Drug use: No   • Sexual activity: Not on file   Lifestyle   • Physical activity:     Days per week: Not on file     Minutes per session: Not on file   • Stress: Not on file   Relationships   • Social connections:     Talks on phone: Not on file     Gets together: Not on file     Attends [employer] service: Not on file     Active member of club or organization: Not on file     Attends meetings of clubs or organizations: Not on file     Relationship status: Not on file   • Intimate partner violence:     Fear of current or ex partner: Not on file     Emotionally abused: Not on file     Physically abused: Not on file     Forced sexual activity: Not on file   Other Topics Concern   • Not on file   Social History Narrative   • Not on file       Family History:  History reviewed. No pertinent family history.    Medications:    Current Outpatient Medications:   •  Insulin Degludec (TRESIBA FLEXTOUCH) 200 UNIT/ML Solution Pen-injector, Inject 100 Units as instructed every bedtime., Disp: 6 PEN, Rfl: 11  •  insulin lispro, Human, (HUMALOG KWIKPEN) 100 UNIT/ML injection PEN, Inject 20 Units as instructed 3 times a day before meals., Disp: 5 PEN, Rfl: 6  •  JARDIANCE 25 MG Tab, Take 1 tablet by mouth every day., Disp: 30 Tab, Rfl:  "3  •  glucose blood (ONETOUCH VERIO) strip, 1 Strip by Other route 3 times a day., Disp: 100 Strip, Rfl: 11  •  methotrexate 2.5 MG Tab, Take 2.5 mg by mouth every 7 days., Disp: , Rfl:   •  leucovorin 5 MG Tab, , Disp: , Rfl:   •  rosuvastatin (CRESTOR) 10 MG Tab, , Disp: , Rfl:   •  ONETOUCH VERIO strip, , Disp: , Rfl: 12  •  folic acid (FOLVITE) 1 MG Tab, , Disp: , Rfl:   •  ALPRAZolam (XANAX) 0.25 MG Tab, , Disp: , Rfl:   •  sulfamethoxazole-trimethoprim (BACTRIM DS) 800-160 MG tablet, , Disp: , Rfl:   •  lorazepam (ATIVAN) 0.5 MG Tab, Take 0.5 mg by mouth every four hours as needed for Anxiety., Disp: , Rfl:   •  hydrocodone-acetaminophen (NORCO) 7.5-325 MG per tablet, Take 1-2 Tabs by mouth every 6 hours as needed., Disp: , Rfl:   •  GABAPENTIN PO, Take  by mouth 3 times a day., Disp: , Rfl:   •  Cholecalciferol (VITAMIN D) 2000 UNITS CAPS, Take 4,000 Units by mouth every day., Disp: , Rfl:   •  Multiple Vitamin (MULTI-VITAMIN DAILY PO), Take  by mouth every day., Disp: , Rfl:         Physical Examination:   Vital signs: /76 (BP Location: Right arm, Patient Position: Sitting, BP Cuff Size: Large adult)   Pulse (!) 105   Ht 1.778 m (5' 10\")   Wt 113.9 kg (251 lb)   SpO2 99%   BMI 36.01 kg/m²   General: No distress, cooperative, well dressed and well nourished.   Eyes: No scleral icterus or discharge, No hyposphagma  ENMT: Normal on external inspection of nose, lips, No nasal drainage   Neck: No abnormal masses on inspection  Resp: Normal effort, Bilateral clear to auscultation, No wheezing, No rales  CVS: Regular rate and rhythm, S1 S2 normal, No murmur. No gallop  Extremities: No edema bilateral extremities  Neuro: Alert and oriented  Skin: No rash, No Ulcers  Psych: Normal mood and affect      Assessment and Plan:    1. Uncontrolled type 2 diabetes mellitus with hyperglycemia (HCC)    Now on:  1.   Jardiance 25mg one a day  2.   Tresiba 60 units before bed (Increase to 70)  3.   Humalog  Carb ratio " of 1:10  Correction Factor 1:30 above 100       4.   Victoza 1.2 daily (not on)     States Metformin causes her to be sick  States GLP-1 cause her to be ill    2. Encounter for long-term (current) use of insulin (HCC)  Is on a high risk medication Insulin and we will continue to follow     Return in about 3 months (around 3/26/2020).      Thank you kindly for allowing me to participate in the diabetes care plan for this patient.    Judd Salas PA-C, BC-ADM  Board Certified - Advanced Diabetes Management  12/26/19    CC:   Bry Eid M.D.

## 2020-08-17 ENCOUNTER — OFFICE VISIT (OUTPATIENT)
Dept: ENDOCRINOLOGY | Facility: MEDICAL CENTER | Age: 45
End: 2020-08-17
Attending: INTERNAL MEDICINE
Payer: COMMERCIAL

## 2020-08-17 VITALS
HEIGHT: 70 IN | BODY MASS INDEX: 36.51 KG/M2 | DIASTOLIC BLOOD PRESSURE: 78 MMHG | HEART RATE: 91 BPM | OXYGEN SATURATION: 94 % | WEIGHT: 255 LBS | SYSTOLIC BLOOD PRESSURE: 120 MMHG

## 2020-08-17 DIAGNOSIS — E78.5 DYSLIPIDEMIA: ICD-10-CM

## 2020-08-17 DIAGNOSIS — E11.65 UNCONTROLLED TYPE 2 DIABETES MELLITUS WITH HYPERGLYCEMIA (HCC): ICD-10-CM

## 2020-08-17 DIAGNOSIS — Z79.84 LONG TERM (CURRENT) USE OF ORAL HYPOGLYCEMIC DRUGS: ICD-10-CM

## 2020-08-17 LAB
HBA1C MFR BLD: 12.2 % (ref 0–5.6)
HBA1C MFR BLD: 12.2 % (ref 0–5.6)
INT CON NEG: NEGATIVE
INT CON NEG: NEGATIVE
INT CON POS: POSITIVE
INT CON POS: POSITIVE

## 2020-08-17 PROCEDURE — 83036 HEMOGLOBIN GLYCOSYLATED A1C: CPT | Performed by: INTERNAL MEDICINE

## 2020-08-17 PROCEDURE — 99212 OFFICE O/P EST SF 10 MIN: CPT | Performed by: INTERNAL MEDICINE

## 2020-08-17 PROCEDURE — 99214 OFFICE O/P EST MOD 30 MIN: CPT | Performed by: INTERNAL MEDICINE

## 2020-08-17 RX ORDER — GLIMEPIRIDE 4 MG/1
4 TABLET ORAL 2 TIMES DAILY
Qty: 180 TAB | Refills: 2 | Status: SHIPPED | OUTPATIENT
Start: 2020-08-17 | End: 2020-11-15

## 2020-08-17 RX ORDER — PIOGLITAZONEHYDROCHLORIDE 30 MG/1
30 TABLET ORAL DAILY
Qty: 90 TAB | Refills: 1 | Status: SHIPPED | OUTPATIENT
Start: 2020-08-17 | End: 2020-11-15

## 2020-08-17 RX ORDER — METFORMIN HYDROCHLORIDE 500 MG/1
500 TABLET, EXTENDED RELEASE ORAL DAILY
Qty: 180 TAB | Refills: 1 | Status: SHIPPED | OUTPATIENT
Start: 2020-08-17 | End: 2020-11-15

## 2020-08-17 RX ORDER — INSULIN GLARGINE 100 [IU]/ML
50 INJECTION, SOLUTION SUBCUTANEOUS EVERY EVENING
Qty: 5 EACH | Refills: 11 | Status: SHIPPED | OUTPATIENT
Start: 2020-08-17 | End: 2020-09-16

## 2020-08-17 NOTE — PROGRESS NOTES
CHIEF COMPLAINT: Patient is here for follow up of Type 2 Diabetes Mellitus    HPI:     Meredith Corral is a 44 y.o. female with Type 2 Diabetes Mellitus here for follow up.    Labs from 8/17/2020 HbA1c is elevated at 12.2%    She was previously seen by the APARNA Salas, she continues to have poorly controlled diabetes unfortunately because she cannot afford her medications.  Specifically she cannot afford the newer medications like GLP-1 and SGLT2 inhibitors and she cannot afford insulin    She has a history of severe obesity    On follow-up she is only taking Humalog insulin she used to be on Jardiance Tresiba and Victoza but she could not afford these medications she reports that she has a high deductible plan    She reports severe hyperglycemia  She denies hypoglycemia    She has hyperlipidemia and is taking Crestor 10 mg daily  We do not have a recent lipid panel on hand        BG Diary:08/17/20  Average blood sugar 400    Weight has been stable    Diabetes Complications   Retinopathy: No known retinopathy.  Last eye exam: She is overdue for an eye exam  Neuropathy: Denies paresthesias or numbness in hands or feet. Denies any foot wounds.  Exercise: Minimal.  Diet: Fair.  Patient's medications, allergies, and social histories were reviewed and updated as appropriate.    ROS:     CONS:     No fever, no chills   EYES:     No diplopia, no blurry vision   CV:           No chest pain, no palpitations   PULM:     No SOB, no cough, no hemoptysis.   GI:            No nausea, no vomiting, no diarrhea, no constipation   ENDO:     No polyuria, no polydipsia, no heat intolerance, no cold intolerance       Past Medical History:  Problem List:  2019-05: Uncontrolled type 2 diabetes mellitus with hyperglycemia   ([facility])  2019-05: Encounter for long-term (current) use of insulin ([facility])  2014-09: Chronic diarrhea  2014-03: Obesity  2013-08: Type II or unspecified type diabetes mellitus without   mention of complication,  "uncontrolled  2013: Lipoprotein deficiency disorder  2013: Vitamin D deficiency  2013: Type II or unspecified type diabetes mellitus without   mention of complication, not stated as uncontrolled      Past Surgical History:  Past Surgical History:   Procedure Laterality Date   • APPENDECTOMY     • CHOLECYSTECTOMY     • LAMINOTOMY     • TONSILLECTOMY          Allergies:  Demerol, Metformin, Morphine, and Trulicity [dulaglutide]     Social History:  Social History     Tobacco Use   • Smoking status: Former Smoker     Packs/day: 0.50     Types: Cigarettes     Quit date: 2013     Years since quittin.2   • Smokeless tobacco: Former User   Substance Use Topics   • Alcohol use: Yes     Comment: rarely   • Drug use: No        Family History:   family history is not on file.      PHYSICAL EXAM:   OBJECTIVE:  Vital signs: /78 (BP Location: Left arm, Patient Position: Sitting, BP Cuff Size: Adult)   Pulse 91   Ht 1.778 m (5' 10\")   Wt 115.7 kg (255 lb)   SpO2 94%   BMI 36.59 kg/m²   GENERAL: Obese female in no apparent distress.   EYE:  No ocular asymmetry, PERRLA  HENT: Pink, moist mucous membranes.    NECK: No thyromegaly.   CARDIOVASCULAR:  No murmurs  LUNGS: Clear breath sounds  ABDOMEN: Soft, nontender   EXTREMITIES: No clubbing, cyanosis, or edema.   NEUROLOGICAL: No gross focal motor abnormalities   LYMPH: No cervical adenopathy palpated.   SKIN: No rashes, lesions.   Monofilament testing with a 10 gram force: sensation: intact bilaterally  Visual Inspection: Feet without maceration, ulcers, or fissures.  Pedal pulses: intact bilaterally    Labs:  Lab Results   Component Value Date/Time    HBA1C 12.2 (A) 2020 09:06 AM        Lab Results   Component Value Date/Time    WBC 10.7 2017 03:06 PM    RBC 5.53 (H) 2017 03:06 PM    HEMOGLOBIN 16.3 (H) 2017 03:06 PM    MCV 85.5 2017 03:06 PM    MCH 29.5 2017 03:06 PM    MCHC 34.5 2017 03:06 PM    RDW 37.2 " 02/28/2017 03:06 PM    MPV 10.8 02/28/2017 03:06 PM       Lab Results   Component Value Date/Time    SODIUM 133 (L) 05/14/2019 12:33 PM    POTASSIUM 4.1 05/14/2019 12:33 PM    CHLORIDE 102 05/14/2019 12:33 PM    CO2 21 05/14/2019 12:33 PM    ANION 10.0 05/14/2019 12:33 PM    GLUCOSE 357 (H) 05/14/2019 12:33 PM    BUN 8 05/14/2019 12:33 PM    CREATININE 0.68 05/14/2019 12:33 PM    CREATININE 0.74 09/18/2014    CALCIUM 9.6 05/14/2019 12:33 PM    ASTSGOT 56 (H) 05/14/2019 12:33 PM    ALTSGPT 41 05/14/2019 12:33 PM    TBILIRUBIN 1.1 05/14/2019 12:33 PM    ALBUMIN 4.1 05/14/2019 12:33 PM    TOTPROTEIN 7.4 05/14/2019 12:33 PM    GLOBULIN 3.3 05/14/2019 12:33 PM    AGRATIO 1.2 05/14/2019 12:33 PM       Lab Results   Component Value Date/Time    CHOLSTRLTOT 172 09/18/2014    TRIGLYCERIDE 292 09/18/2014    HDL 37 09/18/2014    LDL 96 09/18/2014       Lab Results   Component Value Date/Time    MICROALBCALC 0.7 09/18/2014    MALBCRT 18 05/14/2019 12:33 PM    MICROALBUR 1.7 05/14/2019 12:33 PM        Lab Results   Component Value Date/Time    TSHULTRASEN 2.070 07/26/2019 1714     No results found for: FREEDIR  Lab Results   Component Value Date/Time    FREET3 3.51 07/26/2019 1714     No results found for: THYSTIMIG        ASSESSMENT/PLAN:     1. Uncontrolled type 2 diabetes mellitus with hyperglycemia (HCC)  Uncontrolled secondary to hyperglycemia  I am switching her from Tresiba to Lantus because of insurance coverage reasons I want her to take Lantus 20 units daily and titrate until fasting sugar is below 130  Restart metformin extended release 500 mg twice a day  Restart Actos 30 mg daily  Restart glimepiride 4 mg twice a day  Reviewed importance of watching her diet  Reviewed the importance of regular exercise  We will plan for follow-up in 3 months with repeat of her A1c      2. Dyslipidemia  Uncontrolled  Continue Crestor  We will plan for repeat fasting lipids in 3 months    3. Long term (current) use of oral  hypoglycemic drugs  Patient is on multiple oral agents for type 2 diabetes management      Return in about 3 months (around 11/17/2020).      This patient during there office visit today was started on a new medication.  Side effects of the new medication were discussed with the patient today in the office.     Thank you kindly for allowing me to participate in the diabetes care plan for this patient.    Sukhdeep Colon MD, NII, [facility]NU  08/17/20    CC:   Bry Eid M.D.

## 2021-01-22 ENCOUNTER — HOSPITAL ENCOUNTER (OUTPATIENT)
Dept: LAB | Facility: MEDICAL CENTER | Age: 46
End: 2021-01-22
Attending: INTERNAL MEDICINE
Payer: COMMERCIAL

## 2021-01-22 DIAGNOSIS — Z79.84 LONG TERM (CURRENT) USE OF ORAL HYPOGLYCEMIC DRUGS: ICD-10-CM

## 2021-01-22 DIAGNOSIS — E78.5 DYSLIPIDEMIA: ICD-10-CM

## 2021-01-22 DIAGNOSIS — E11.65 UNCONTROLLED TYPE 2 DIABETES MELLITUS WITH HYPERGLYCEMIA (HCC): ICD-10-CM

## 2021-01-22 LAB
ALBUMIN SERPL BCP-MCNC: 4.4 G/DL (ref 3.2–4.9)
ALBUMIN/GLOB SERPL: 1.6 G/DL
ALP SERPL-CCNC: 74 U/L (ref 30–99)
ALT SERPL-CCNC: 38 U/L (ref 2–50)
ANION GAP SERPL CALC-SCNC: 13 MMOL/L (ref 7–16)
AST SERPL-CCNC: 34 U/L (ref 12–45)
BILIRUB SERPL-MCNC: 0.9 MG/DL (ref 0.1–1.5)
BUN SERPL-MCNC: 9 MG/DL (ref 8–22)
CALCIUM SERPL-MCNC: 9.3 MG/DL (ref 8.5–10.5)
CHLORIDE SERPL-SCNC: 101 MMOL/L (ref 96–112)
CHOLEST SERPL-MCNC: 168 MG/DL (ref 100–199)
CO2 SERPL-SCNC: 20 MMOL/L (ref 20–33)
CREAT SERPL-MCNC: 0.53 MG/DL (ref 0.5–1.4)
CREAT UR-MCNC: 120.2 MG/DL
EST. AVERAGE GLUCOSE BLD GHB EST-MCNC: 258 MG/DL
FASTING STATUS PATIENT QL REPORTED: NORMAL
GLOBULIN SER CALC-MCNC: 2.8 G/DL (ref 1.9–3.5)
GLUCOSE SERPL-MCNC: 257 MG/DL (ref 65–99)
HBA1C MFR BLD: 10.6 % (ref 0–5.6)
HDLC SERPL-MCNC: 41 MG/DL
LDLC SERPL CALC-MCNC: 95 MG/DL
MICROALBUMIN UR-MCNC: 2.4 MG/DL
MICROALBUMIN/CREAT UR: 20 MG/G (ref 0–30)
POTASSIUM SERPL-SCNC: 3.9 MMOL/L (ref 3.6–5.5)
PROT SERPL-MCNC: 7.2 G/DL (ref 6–8.2)
SODIUM SERPL-SCNC: 134 MMOL/L (ref 135–145)
T4 FREE SERPL-MCNC: 1.17 NG/DL (ref 0.93–1.7)
TRIGL SERPL-MCNC: 160 MG/DL (ref 0–149)
TSH SERPL DL<=0.005 MIU/L-ACNC: 1.44 UIU/ML (ref 0.38–5.33)

## 2021-01-22 PROCEDURE — 83036 HEMOGLOBIN GLYCOSYLATED A1C: CPT

## 2021-01-22 PROCEDURE — 80053 COMPREHEN METABOLIC PANEL: CPT

## 2021-01-22 PROCEDURE — 82043 UR ALBUMIN QUANTITATIVE: CPT

## 2021-01-22 PROCEDURE — 84439 ASSAY OF FREE THYROXINE: CPT

## 2021-01-22 PROCEDURE — 36415 COLL VENOUS BLD VENIPUNCTURE: CPT

## 2021-01-22 PROCEDURE — 80061 LIPID PANEL: CPT

## 2021-01-22 PROCEDURE — 84443 ASSAY THYROID STIM HORMONE: CPT

## 2021-01-22 PROCEDURE — 82570 ASSAY OF URINE CREATININE: CPT

## 2021-01-25 ENCOUNTER — OFFICE VISIT (OUTPATIENT)
Dept: ENDOCRINOLOGY | Facility: MEDICAL CENTER | Age: 46
End: 2021-01-25
Attending: INTERNAL MEDICINE
Payer: COMMERCIAL

## 2021-01-25 DIAGNOSIS — Z79.84 LONG TERM (CURRENT) USE OF ORAL HYPOGLYCEMIC DRUGS: ICD-10-CM

## 2021-01-25 DIAGNOSIS — E11.65 UNCONTROLLED TYPE 2 DIABETES MELLITUS WITH HYPERGLYCEMIA (HCC): ICD-10-CM

## 2021-01-25 DIAGNOSIS — E78.5 DYSLIPIDEMIA: ICD-10-CM

## 2021-01-25 PROCEDURE — 99214 OFFICE O/P EST MOD 30 MIN: CPT | Mod: 95,CR | Performed by: INTERNAL MEDICINE

## 2021-01-25 RX ORDER — GLIMEPIRIDE 4 MG/1
4 TABLET ORAL 2 TIMES DAILY
Qty: 180 TAB | Refills: 3 | Status: SHIPPED | OUTPATIENT
Start: 2021-01-25

## 2021-01-25 RX ORDER — ATORVASTATIN CALCIUM 40 MG/1
40 TABLET, FILM COATED ORAL DAILY
Qty: 90 TAB | Refills: 3 | Status: SHIPPED | OUTPATIENT
Start: 2021-01-25

## 2021-01-25 RX ORDER — GLIMEPIRIDE 4 MG/1
4 TABLET ORAL 2 TIMES DAILY
Qty: 180 TAB | Refills: 3 | Status: SHIPPED | OUTPATIENT
Start: 2021-01-25 | End: 2021-01-25 | Stop reason: SDUPTHER

## 2021-01-25 RX ORDER — METFORMIN HYDROCHLORIDE 500 MG/1
500 TABLET, EXTENDED RELEASE ORAL 2 TIMES DAILY
Qty: 180 TAB | Refills: 3 | Status: SHIPPED | OUTPATIENT
Start: 2021-01-25

## 2021-01-25 RX ORDER — METFORMIN HYDROCHLORIDE 500 MG/1
500 TABLET, EXTENDED RELEASE ORAL 2 TIMES DAILY
Qty: 180 TAB | Refills: 3 | Status: SHIPPED | OUTPATIENT
Start: 2021-01-25 | End: 2021-01-25 | Stop reason: SDUPTHER

## 2021-01-25 RX ORDER — PIOGLITAZONEHYDROCHLORIDE 30 MG/1
30 TABLET ORAL DAILY
Qty: 90 TAB | Refills: 3 | Status: SHIPPED | OUTPATIENT
Start: 2021-01-25 | End: 2021-04-25

## 2021-01-25 RX ORDER — PIOGLITAZONEHYDROCHLORIDE 30 MG/1
30 TABLET ORAL DAILY
Qty: 90 TAB | Refills: 3 | Status: SHIPPED | OUTPATIENT
Start: 2021-01-25 | End: 2021-01-25 | Stop reason: SDUPTHER

## 2021-01-26 NOTE — PROGRESS NOTES
CHIEF COMPLAINT: Patient is here for follow up of Type 2 Diabetes Mellitus.  Patient was presented for a telehealth consultation via secure and encrypted videoconferencing technology. This encounter was conducted via [email or web address]. Verbal consent was obtained. Patient's identity was verified.    HPI:     Meredith Corral is a 44 y.o. female with Type 2 Diabetes Mellitus here for follow up.    Labs from January 22, 2021 show A1c is elevated at 10.6%  Labs from 8/17/2020 HbA1c is elevated at 12.2%    She was previously seen by the APARNA Salas, she continues to have poorly controlled diabetes unfortunately because she cannot afford her medications.  Specifically she cannot afford the newer medications like GLP-1 and SGLT2 inhibitors and she cannot afford insulin    She has a history of severe obesity    On follow-up she is taking Novolin N 10 units every night and Novolin R 10 units 3 times a day, Metformin 500 mg daily and glimepiride 4 mg twice a day.    For unclear reasons [employer] would not cover Actos even though this is a generic medication      She reports that she has less hyperglycemia overall and she is very happy that her A1c is down by 2 points      She stopped taking Crestor for unclear reasons, her LDL cholesterol is 95 with triglycerides of 160 on January 22, 2021    She does not have microalbuminuria    Her baseline TSH is normal at 1.4        BG Diary:  Average blood sugar 270    Weight has been stable    Diabetes Complications   Retinopathy: No known retinopathy.  Last eye exam: She is overdue for an eye exam  Neuropathy: Denies paresthesias or numbness in hands or feet. Denies any foot wounds.  Exercise: Minimal.  Diet: Fair.  Patient's medications, allergies, and social histories were reviewed and updated as appropriate.    ROS:     CONS:     No fever, no chills   EYES:     No diplopia, no blurry vision   CV:           No chest pain, no palpitations   PULM:     No SOB, no cough, no  hemoptysis.   GI:            No nausea, no vomiting, no diarrhea, no constipation   ENDO:     No polyuria, no polydipsia, no heat intolerance, no cold intolerance       Past Medical History:  Problem List:  2021: Dyslipidemia  2021: Long term (current) use of oral hypoglycemic drugs  2019: Uncontrolled type 2 diabetes mellitus with hyperglycemia   ([facility])  2019: Encounter for long-term (current) use of insulin ([facility])  2014: Chronic diarrhea  2014: Obesity  2013: Type II or unspecified type diabetes mellitus without   mention of complication, uncontrolled  2013: Lipoprotein deficiency disorder  2013: Vitamin D deficiency  2013: Type II or unspecified type diabetes mellitus without   mention of complication, not stated as uncontrolled      Past Surgical History:  Past Surgical History:   Procedure Laterality Date   • APPENDECTOMY     • CHOLECYSTECTOMY     • LAMINOTOMY     • TONSILLECTOMY          Allergies:  Demerol, Metformin, Morphine, and Trulicity [dulaglutide]     Social History:  Social History     Tobacco Use   • Smoking status: Former Smoker     Packs/day: 0.50     Types: Cigarettes     Quit date: 2013     Years since quittin.7   • Smokeless tobacco: Former User   Substance Use Topics   • Alcohol use: Yes     Comment: rarely   • Drug use: No        Family History:   family history is not on file.      PHYSICAL EXAM:   OBJECTIVE:  Vital signs: There were no vitals taken for this visit.  GENERAL: Obese female in no apparent distress.   EYE:  No ocular asymmetry, PERRLA  HENT: Pink, moist mucous membranes.    NECK: No thyromegaly.   CARDIOVASCULAR:  No murmurs  LUNGS: Clear breath sounds  ABDOMEN: Soft, nontender   EXTREMITIES: No clubbing, cyanosis, or edema.   NEUROLOGICAL: No gross focal motor abnormalities   LYMPH: No cervical adenopathy seen   SKIN: No rashes, lesions.     Labs:  Lab Results   Component Value Date/Time    HBA1C 10.6 (H) 2021 01:06 PM        Lab  Results   Component Value Date/Time    WBC 10.7 02/28/2017 03:06 PM    RBC 5.53 (H) 02/28/2017 03:06 PM    HEMOGLOBIN 16.3 (H) 02/28/2017 03:06 PM    MCV 85.5 02/28/2017 03:06 PM    MCH 29.5 02/28/2017 03:06 PM    MCHC 34.5 02/28/2017 03:06 PM    RDW 37.2 02/28/2017 03:06 PM    MPV 10.8 02/28/2017 03:06 PM       Lab Results   Component Value Date/Time    SODIUM 134 (L) 01/22/2021 01:06 PM    POTASSIUM 3.9 01/22/2021 01:06 PM    CHLORIDE 101 01/22/2021 01:06 PM    CO2 20 01/22/2021 01:06 PM    ANION 13.0 01/22/2021 01:06 PM    GLUCOSE 257 (H) 01/22/2021 01:06 PM    BUN 9 01/22/2021 01:06 PM    CREATININE 0.53 01/22/2021 01:06 PM    CREATININE 0.74 09/18/2014    CALCIUM 9.3 01/22/2021 01:06 PM    ASTSGOT 34 01/22/2021 01:06 PM    ALTSGPT 38 01/22/2021 01:06 PM    TBILIRUBIN 0.9 01/22/2021 01:06 PM    ALBUMIN 4.4 01/22/2021 01:06 PM    TOTPROTEIN 7.2 01/22/2021 01:06 PM    GLOBULIN 2.8 01/22/2021 01:06 PM    AGRATIO 1.6 01/22/2021 01:06 PM       Lab Results   Component Value Date/Time    CHOLSTRLTOT 172 09/18/2014    TRIGLYCERIDE 292 09/18/2014    HDL 37 09/18/2014    LDL 96 09/18/2014       Lab Results   Component Value Date/Time    MICROALBCALC 0.7 09/18/2014    MALBCRT 20 01/22/2021 01:07 PM    MICROALBUR 2.4 01/22/2021 01:07 PM        Lab Results   Component Value Date/Time    TSHULTRASEN 2.070 07/26/2019 1714     No results found for: FREEDIR  Lab Results   Component Value Date/Time    FREET3 3.51 07/26/2019 1714     No results found for: THYSTIMIG        ASSESSMENT/PLAN:     1. Uncontrolled type 2 diabetes mellitus with hyperglycemia (HCC)  Uncontrolled secondary to hyperglycemia  Increase Novolin N to 20 units twice a day  Continue Novolin R 10 units 3 times a day  Increase Metformin to 500 mg twice a day  Restart Actos 30 mg daily I am sending it to [facility]  Continue glimepiride 4 mg twice a day      2. Dyslipidemia  Controlled  Restart atorvastatin 40 mg daily repeat fasting lipids in 6 to 12 months    3. Long  term (current) use of oral hypoglycemic drugs  Patient is on multiple oral agents for type 2 diabetes management      Return in about 3 months (around 4/25/2021).      This patient during there office visit today was started on a new medication.  Side effects of the new medication were discussed with the patient today in the office.     Thank you kindly for allowing me to participate in the diabetes care plan for this patient.    Sukhdeep Colon MD, formerly [facility], [facility]  08/17/20    CC:   Bry Eid M.D.